# Patient Record
Sex: MALE | Race: BLACK OR AFRICAN AMERICAN | URBAN - METROPOLITAN AREA
[De-identification: names, ages, dates, MRNs, and addresses within clinical notes are randomized per-mention and may not be internally consistent; named-entity substitution may affect disease eponyms.]

---

## 2024-04-09 ENCOUNTER — LAB (OUTPATIENT)
Dept: URBAN - METROPOLITAN AREA CLINIC 107 | Facility: CLINIC | Age: 89
End: 2024-04-09

## 2024-04-09 ENCOUNTER — OV NP (OUTPATIENT)
Dept: URBAN - METROPOLITAN AREA CLINIC 107 | Facility: CLINIC | Age: 89
End: 2024-04-09
Payer: MEDICARE

## 2024-04-09 VITALS
TEMPERATURE: 98.2 F | DIASTOLIC BLOOD PRESSURE: 71 MMHG | HEIGHT: 71 IN | HEART RATE: 108 BPM | WEIGHT: 183 LBS | BODY MASS INDEX: 25.62 KG/M2 | SYSTOLIC BLOOD PRESSURE: 132 MMHG

## 2024-04-09 DIAGNOSIS — D50.9 IRON DEFICIENCY ANEMIA, UNSPECIFIED IRON DEFICIENCY ANEMIA TYPE: ICD-10-CM

## 2024-04-09 DIAGNOSIS — R10.13 EPIGASTRIC ABDOMINAL PAIN: ICD-10-CM

## 2024-04-09 DIAGNOSIS — K59.09 OTHER CONSTIPATION: ICD-10-CM

## 2024-04-09 DIAGNOSIS — R10.33 PERIUMBILICAL ABDOMINAL PAIN: ICD-10-CM

## 2024-04-09 PROBLEM — 443503005: Status: ACTIVE | Noted: 2024-04-09

## 2024-04-09 PROBLEM — 79922009: Status: ACTIVE | Noted: 2024-04-09

## 2024-04-09 PROBLEM — 87522002: Status: ACTIVE | Noted: 2024-04-09

## 2024-04-09 PROBLEM — 14760008: Status: ACTIVE | Noted: 2024-04-09

## 2024-04-09 PROCEDURE — 99204 OFFICE O/P NEW MOD 45 MIN: CPT | Performed by: NURSE PRACTITIONER

## 2024-04-09 RX ORDER — BLOOD SUGAR DIAGNOSTIC
STRIP MISCELLANEOUS
Qty: 100 STRIP | Status: ACTIVE | COMMUNITY

## 2024-04-09 RX ORDER — PANTOPRAZOLE SODIUM 40 MG/1
1 TABLET TABLET, DELAYED RELEASE ORAL ONCE A DAY
Qty: 30 | Refills: 5 | OUTPATIENT
Start: 2024-04-09

## 2024-04-09 RX ORDER — SITAGLIPTIN AND METFORMIN HYDROCHLORIDE 50; 1000 MG/1; MG/1
TABLET, FILM COATED ORAL
Qty: 60 TABLET | Status: ACTIVE | COMMUNITY

## 2024-04-09 RX ORDER — FERROUS SULFATE 325(65) MG
TAKE 1 TABLET BY MOUTH EVERY OTHER DAY TABLET ORAL
Qty: 45 EACH | Refills: 0 | Status: ACTIVE | COMMUNITY

## 2024-04-09 RX ORDER — VIBEGRON 75 MG/1
TABLET, FILM COATED ORAL
Qty: 30 TABLET | Status: ACTIVE | COMMUNITY

## 2024-04-09 NOTE — HPI-TODAY'S VISIT:
This is an 89-year-old male with a history of prostate cancer and diabetes presenting for evaluation of abdominal pain. He reports onset of symptoms more than 3 months ago and less than 6 months ago.  He has intermittent pain indicated in the epigastrium and periumbilical areas.  This occurs at least weekly and occasionally daily.  It is not postprandial but if pain is present, it worsen after meals.  Pain may last throughout the day or may improve after a bowel movement.  He has intermittent tenderness.  He describes his pain as "just pain."  He denies heartburn, regurgitation, dysphagia, or nausea.  At baseline, he has a bowel movement every day.  Now, he may have 2 or 3 days or as many as 7 days without a bowel movement.  Stools may be hard or loose.  He may have bowel movements twice a day.  He reports a history of hemorrhoids.  He denies bleeding.  He denies other abdominal symptoms.  His weight has been stable. He has a history of iron deficiency anemia.  He has previously required iron infusions.  He had an EGD approximately a year ago at [facility].  He cannot recall the name of the physician.  His last colonoscopy was 8 years ago.  This was performed by Dr. Mayfield. He went to the emergency department at [facility] in December or January for chest pain.  He was admitted for 2 days.  He reports a negative evaluation.

## 2024-04-09 NOTE — HPI-OTHER HISTORIES
Labs 12/13/2023 CBC: WBC 9.78, hemoglobin 8, MCV 68.9, platelet 180.  12/12/2023: CBC: WBC 11.5, hemoglobin 8.7, MCV 67.8, platelet 251.  D-dimer 0.44.  BMP normal with exception of glucose 123, creatinine 0.6, chloride 108.  LFTs normal TB 0.2, ALP 65, ALT 18, AST less than 14.  Cholesterol panel normal with exception of HDL 27.  10/5/2023: Endomysial IgA negative.  TTG IgA less than 2.  IgA 580.  9/5/2023: CBC: WBC 12, hemoglobin 9.2, MCV 73, platelet 237.  BMP normal with exception of glucose 106, creatinine 0.59.  LFTs normal TB 0.4, ALP 70, ALT 15, AST 16.

## 2024-05-31 ENCOUNTER — TELEPHONE ENCOUNTER (OUTPATIENT)
Dept: URBAN - METROPOLITAN AREA CLINIC 113 | Facility: CLINIC | Age: 89
End: 2024-05-31

## 2024-06-18 ENCOUNTER — OFFICE VISIT (OUTPATIENT)
Dept: URBAN - METROPOLITAN AREA CLINIC 113 | Facility: CLINIC | Age: 89
End: 2024-06-18

## 2024-09-28 ENCOUNTER — ERX REFILL RESPONSE (OUTPATIENT)
Dept: URBAN - METROPOLITAN AREA CLINIC 72 | Facility: CLINIC | Age: 89
End: 2024-09-28

## 2024-09-28 RX ORDER — PANTOPRAZOLE SODIUM 40 MG/1
TAKE 1 TABLET BY MOUTH EVERY DAY FOR 30 DAYS TABLET, DELAYED RELEASE ORAL
Qty: 90 TABLET | Refills: 3 | OUTPATIENT

## 2024-09-28 RX ORDER — PANTOPRAZOLE SODIUM 40 MG/1
1 TABLET TABLET, DELAYED RELEASE ORAL ONCE A DAY
Qty: 30 | Refills: 5 | OUTPATIENT